# Patient Record
Sex: FEMALE | URBAN - METROPOLITAN AREA
[De-identification: names, ages, dates, MRNs, and addresses within clinical notes are randomized per-mention and may not be internally consistent; named-entity substitution may affect disease eponyms.]

---

## 2020-08-26 VITALS
OXYGEN SATURATION: 97 % | TEMPERATURE: 97.6 F | DIASTOLIC BLOOD PRESSURE: 70 MMHG | HEIGHT: 61 IN | WEIGHT: 105 LBS | SYSTOLIC BLOOD PRESSURE: 110 MMHG | BODY MASS INDEX: 19.83 KG/M2 | HEART RATE: 84 BPM

## 2020-08-26 RX ORDER — SERTRALINE HYDROCHLORIDE 100 MG/1
100 TABLET, FILM COATED ORAL 2 TIMES DAILY
COMMUNITY
Start: 2020-07-29 | End: 2020-08-27 | Stop reason: SDUPTHER

## 2020-08-26 RX ORDER — LORAZEPAM 1 MG/1
1 TABLET ORAL 2 TIMES DAILY
COMMUNITY
Start: 2020-06-04 | End: 2020-08-27 | Stop reason: SDUPTHER

## 2020-08-27 ENCOUNTER — VIRTUAL VISIT (OUTPATIENT)
Dept: FAMILY MEDICINE CLINIC | Age: 20
End: 2020-08-27
Payer: MEDICAID

## 2020-08-27 DIAGNOSIS — F41.9 ANXIETY: Primary | ICD-10-CM

## 2020-08-27 DIAGNOSIS — F32.5 MAJOR DEPRESSIVE DISORDER WITH SINGLE EPISODE, IN FULL REMISSION (HCC): ICD-10-CM

## 2020-08-27 PROCEDURE — 99213 OFFICE O/P EST LOW 20 MIN: CPT | Performed by: FAMILY MEDICINE

## 2020-08-27 RX ORDER — SERTRALINE HYDROCHLORIDE 100 MG/1
100 TABLET, FILM COATED ORAL 2 TIMES DAILY
Qty: 180 TAB | Refills: 2 | Status: SHIPPED | OUTPATIENT
Start: 2020-08-27 | End: 2021-07-29 | Stop reason: SDUPTHER

## 2020-08-27 RX ORDER — LORAZEPAM 1 MG/1
1 TABLET ORAL
Qty: 15 TAB | Refills: 0 | Status: SHIPPED | OUTPATIENT
Start: 2020-08-27 | End: 2021-07-29 | Stop reason: SDUPTHER

## 2020-08-27 NOTE — PROGRESS NOTES
Consent: Jolly Pope, who was seen by synchronous (real-time) audio-video technology, and/or her healthcare decision maker, is aware that this patient-initiated, Telehealth encounter on 8/27/2020 is a billable service, with coverage as determined by her insurance carrier. She is aware that she may receive a bill and has provided verbal consent to proceed: YES-Consent obtained within past 12 months        712  Subjective:   Jolly Pope is a 23 y.o. female who was seen for Depression and Medication Refill      This is an established patient of the practice that is new to me. She is here today for follow-up of her moods. Her depression is doing much better since her last visit. She is getting further away from the break-up with her long-term boyfriend and is finding herself less tearful and sad on a day by day basis. She also notes that her anxiety continues to be well controlled most day with only needing her lorazepam on average about once per week. She would like to continue at current medication dosing at this time noting that she needs a refill on sertraline and will soon need a refill on the lorazepam.  No acute concerns or complaints today. See HPI for pertinent review of systems    Prior to Admission medications    Medication Sig Start Date End Date Taking? Authorizing Provider   sertraline (ZOLOFT) 100 mg tablet Take 1 Tab by mouth two (2) times a day. 8/27/20  Yes Taiwo Cruz MD   LORazepam (ATIVAN) 1 mg tablet Take 1 Tab by mouth two (2) times daily as needed for Anxiety. Max Daily Amount: 2 mg. Take 1 tab twice a day by mouth. 8/27/20  Yes Taiwo Cruz MD   LORazepam (ATIVAN) 1 mg tablet Take 1 mg by mouth two (2) times a day. Take 1 tab twice a day by mouth. 6/4/20 8/27/20  Provider, Historical   sertraline (ZOLOFT) 100 mg tablet Take 100 mg by mouth two (2) times a day.  7/29/20 8/27/20  Provider, Historical     No Known Allergies  Patient Active Problem List    Diagnosis    Anxiety    Depression       Objective:   Vital Signs: (As obtained by patient/caregiver at home)  There were no vitals taken for this visit. [INSTRUCTIONS:  \"[x]\" Indicates a positive item  \"[]\" Indicates a negative item  -- DELETE ALL ITEMS NOT EXAMINED]    Constitutional: [x] Appears well-developed and well-nourished [x] No apparent distress      [] Abnormal -     Mental status: [x] Alert and awake  [x] Oriented to person/place/time [x] Able to follow commands    [] Abnormal -     Eyes:   EOM    [x]  Normal    [] Abnormal -   Sclera  [x]  Normal    [] Abnormal -          Discharge [x]  None visible   [] Abnormal -     HENT: [x] Normocephalic, atraumatic  [] Abnormal -   [x] Mouth/Throat: Mucous membranes are moist    External Ears [x] Normal  [] Abnormal -    Neck: [x] No visualized mass [] Abnormal -     Pulmonary/Chest: [x] Respiratory effort normal   [x] No visualized signs of difficulty breathing or respiratory distress        [] Abnormal -        Neurological:        [x] No Facial Asymmetry (Cranial nerve 7 motor function) (limited exam due to video visit)          [x] No gaze palsy        [] Abnormal -          Skin:        [x] No significant exanthematous lesions or discoloration noted on facial skin         [] Abnormal -            Psychiatric:       [x] Normal Affect [] Abnormal -        [x] No Hallucinations    Other pertinent observable physical exam findings:-              Assessment & Plan:   Diagnoses and all orders for this visit:    1. Anxiety  -     LORazepam (ATIVAN) 1 mg tablet; Take 1 Tab by mouth two (2) times daily as needed for Anxiety. Max Daily Amount: 2 mg. Take 1 tab twice a day by mouth. 2. Major depressive disorder with single episode, in full [facility] ([facility]    Other orders  -     sertraline (ZOLOFT) 100 mg tablet; Take 1 Tab by mouth two (2) times a day. Patient's moods are currently well controlled.   I did review with patient that when she feels ready to start titrating back on the sertraline that we would put her down to 150 mg instead of the 200 mg daily and keep her on that dose for 2 to 3 months before considering cutting back further. She may do so at any time she feels she is ready. I did review the  and her fill dates are consistent with taking the lorazepam on average once weekly. Refill provided. Follow-up and Dispositions    · Return in about 6 months (around 2/27/2021) for f/u moods. We discussed the expected course, resolution and complications of the diagnosis(es) in detail. Medication risks, benefits, costs, interactions, and alternatives were discussed as indicated. I advised her to contact the office if her condition worsens, changes or fails to improve as anticipated. She expressed understanding with the diagnosis(es) and plan. Ginny Palumbo is a 23 y.o. female being evaluated by a video visit encounter for concerns as above. A caregiver was present when appropriate. Due to this being a TeleHealth encounter (During [facility]- public [facility] emergency), evaluation of the following organ systems was limited: Vitals/Constitutional/EENT/Resp/CV/GI//MS/Neuro/Skin/Heme-Lymph-Imm. Pursuant to the emergency declaration under the [facility], [facility] waiver authority and the [employer] Act, this Virtual  Visit was conducted, with patient's (and/or legal guardian's) consent, to reduce the patient's risk of exposure to COVID-19 and provide necessary medical care. Services were provided through a video synchronous discussion virtually to substitute for in-person clinic visit. Patient and provider were located at their individual homes.         Baltazar Gaines MD

## 2021-06-28 ENCOUNTER — OFFICE VISIT (OUTPATIENT)
Dept: FAMILY MEDICINE CLINIC | Age: 21
End: 2021-06-28
Payer: MEDICAID

## 2021-06-28 VITALS
BODY MASS INDEX: 22.2 KG/M2 | DIASTOLIC BLOOD PRESSURE: 76 MMHG | RESPIRATION RATE: 12 BRPM | HEIGHT: 61 IN | WEIGHT: 117.6 LBS | SYSTOLIC BLOOD PRESSURE: 112 MMHG | HEART RATE: 64 BPM | OXYGEN SATURATION: 98 % | TEMPERATURE: 98.4 F

## 2021-06-28 DIAGNOSIS — R05.3 PERSISTENT COUGH FOR 3 WEEKS OR LONGER: Primary | ICD-10-CM

## 2021-06-28 DIAGNOSIS — Z77.22 SECONDHAND SMOKE EXPOSURE: ICD-10-CM

## 2021-06-28 DIAGNOSIS — R09.82 POST-NASAL DRIP: ICD-10-CM

## 2021-06-28 PROCEDURE — 99214 OFFICE O/P EST MOD 30 MIN: CPT | Performed by: NURSE PRACTITIONER

## 2021-06-28 RX ORDER — CETIRIZINE HCL 10 MG
10 TABLET ORAL
Qty: 90 TABLET | Refills: 3 | Status: SHIPPED | OUTPATIENT
Start: 2021-06-28 | End: 2021-07-29 | Stop reason: ALTCHOICE

## 2021-06-28 NOTE — PATIENT INSTRUCTIONS
Seasonal Allergies: Care Instructions  Your Care Instructions     Allergies occur when your body's defense system (immune system) overreacts to certain substances. The immune system treats a harmless substance as if it were a harmful germ or virus. Many things can cause this to happen. Examples include pollens, medicine, food, dust, animal dander, and mold. Your allergies are seasonal if you have symptoms just at certain times of the year. In that case, you are probably allergic to pollens from certain trees, grasses, or weeds. Allergies can be mild or severe. Over-the-counter allergy medicine may help with some symptoms. Read and follow all instructions on the label. Managing your allergies is an important part of staying healthy. Your doctor may suggest that you have tests to help find the cause of your allergies. When you know what things trigger your symptoms, you can avoid them. This can prevent allergy symptoms and other health problems. In some cases, immunotherapy might help. For this treatment, you get shots or use pills that have a small amount of certain allergens in them. Your body \"gets used to\" the allergen, so you react less to it over time. This kind of treatment may help prevent or reduce some allergy symptoms. Follow-up care is a key part of your treatment and safety. Be sure to make and go to all appointments, and call your doctor if you are having problems. It's also a good idea to know your test results and keep a list of the medicines you take. How can you care for yourself at home? · Be safe with medicines. Take your medicines exactly as prescribed. Call your doctor if you think you are having a problem with your medicine. · During your allergy season, keep windows closed. If you need to use air-conditioning, change or clean all filters every month. Take a shower and change your clothes after you have been outside. · Stay inside when pollen counts are high.  Vacuum once or twice a week. Use a vacuum  with a HEPA filter or a double-thickness filter. When should you call for help? Give an epinephrine shot if:    · You think you are having a severe allergic reaction. After giving an epinephrine shot, call 911, even if you feel better. Call 911 if:    · You have symptoms of a severe allergic reaction. These may include:  ? Sudden raised, red areas (hives) all over your body. ? Swelling of the throat, mouth, lips, or tongue. ? Trouble breathing. ? Passing out (losing consciousness). Or you may feel very lightheaded or suddenly feel weak, confused, or restless.     · You have been given an epinephrine shot, even if you feel better. Call your doctor now or seek immediate medical care if:    · You have symptoms of an allergic reaction, such as:  ? A rash or hives (raised, red areas on the skin). ? Itching. ? Swelling. ? Belly pain, nausea, or vomiting. Watch closely for changes in your health, and be sure to contact your doctor if:    · You do not get better as expected. Where can you learn more? Go to http://www.gray.com/  Enter J912 in the search box to learn more about \"Seasonal Allergies: Care Instructions. \"  Current as of: November 6, 2020               Content Version: 12.8  © 3072-9638 [employer]. Care instructions adapted under license by [employer] (which disclaims liability or warranty for this information). If you have questions about a medical condition or this instruction, always ask your healthcare professional. [place] [address] any warranty or liability for your use of this information.

## 2021-06-28 NOTE — PROGRESS NOTES
Subjective  Chief Complaint   Patient presents with    Shortness of Breath     HPI:  Germania Read is a 19 y.o. female. Cough  Patient complains of nasal congestion and productive cough with sputum described as clear or white;and stringy . 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 The cough is without wheezing, dyspnea or hemoptysis and is aggravated by reclining position. Associated symptoms include:she doesn't have SOB, but notes a tightness or pressure in chest; symptoms seem improved from initial onset, and from when she quit the position in may. Symptoms are generally worse in the morning when she wakes, but are persistent throughout the day. Patient does have pets, 2 cats and a dog. Patient does not have a history of asthma. Patient does have a history of environmental allergens. Patient does not have recent travel. Patient does not have a history of smoking. Patient  does not have previous Chest X-ray. Patient does not have had a PPD done. Attempted treatments so far dayquil/nyquil with some temporary relief. Mucinex helped some thinning mucous. She is not currently vaccinated against covid 19, no sick contacts, she is a non-smoker. She also has a history of acid reflux. Objective  Visit Vitals  /76 (BP 1 Location: Left upper arm, BP Patient Position: Sitting)   Pulse 64   Temp 98.4 °F (36.9 °C) (Temporal)   Resp 12   Ht 5' 1\" (1.549 m)   Wt 117 lb 9.6 oz (53.3 kg)   SpO2 98%   BMI 22.22 kg/m²     Physical Exam  Constitutional:       Appearance: Normal appearance. HENT:      Head: Normocephalic. Right Ear: Tympanic membrane and ear canal normal.      Left Ear: Tympanic membrane and ear canal normal.      Nose: Rhinorrhea (mild) present.       Mouth/Throat:      Mouth: Mucous membranes are moist.      Pharynx: No oropharyngeal exudate or posterior oropharyngeal erythema (mild postnasal drip). Eyes:      Extraocular Movements: Extraocular movements intact. Pupils: Pupils are equal, round, and reactive to light. Cardiovascular:      Rate and Rhythm: Normal rate and regular rhythm. Pulmonary:      Effort: Pulmonary effort is normal.      Breath sounds: Normal breath sounds. Musculoskeletal:      Cervical back: Normal range of motion and neck supple. Neurological:      Mental Status: She is alert. Assessment & Plan    Diagnoses and all orders for this visit:    1. Persistent cough for 3 weeks or longer  -     cetirizine (ZYRTEC) 10 mg tablet; Take 1 Tablet by mouth nightly. Indications: inflammation of the nose due to an allergy  We discussed the most common causes of cough lasting greater than 3 weeks or generally allergic or acid reflux. In this instance the onset was triggered by cleaning of a client's home who was a heavy smoker, which suggest that cigarette smoke is a trigger for her symptoms, and then as exposure was removed may symptoms subsided somewhat, but likely continue to be triggered by either pets in the home or pollen season only. I recommended we start with cetirizine 10 mg nightly, monitor watchful waiting, and if no improvement will have her come in for asthma testing. 2. Secondhand smoke exposure    3. Post-nasal drip  -     cetirizine (ZYRTEC) 10 mg tablet; Take 1 Tablet by mouth nightly.  Indications: inflammation of the nose due to an allergy        Alice Adkins NP

## 2021-07-29 ENCOUNTER — VIRTUAL VISIT (OUTPATIENT)
Dept: FAMILY MEDICINE CLINIC | Age: 21
End: 2021-07-29
Payer: MEDICAID

## 2021-07-29 DIAGNOSIS — F41.9 ANXIETY: Primary | ICD-10-CM

## 2021-07-29 DIAGNOSIS — F32.5 MAJOR DEPRESSIVE DISORDER WITH SINGLE EPISODE, IN FULL REMISSION (HCC): ICD-10-CM

## 2021-07-29 PROCEDURE — 99214 OFFICE O/P EST MOD 30 MIN: CPT | Performed by: FAMILY MEDICINE

## 2021-07-29 RX ORDER — SERTRALINE HYDROCHLORIDE 100 MG/1
100 TABLET, FILM COATED ORAL 2 TIMES DAILY
Qty: 180 TABLET | Refills: 2 | Status: SHIPPED | OUTPATIENT
Start: 2021-07-29 | End: 2022-01-26 | Stop reason: SDUPTHER

## 2021-07-29 RX ORDER — LORAZEPAM 1 MG/1
1 TABLET ORAL
Qty: 15 TABLET | Refills: 0 | Status: SHIPPED | OUTPATIENT
Start: 2021-07-29 | End: 2022-01-26 | Stop reason: SDUPTHER

## 2021-07-29 NOTE — PROGRESS NOTES
Chief Complaint   Patient presents with    Medication Refill     ativan    Medication Evaluation     Appt needed to get her refill       1. Have you been to the ER, urgent care clinic since your last visit? Hospitalized since your last visit? No    2. Have you seen or consulted any other health care providers outside of the [address] [place] [address] since your last visit? Include any pap smears or colon screening.  No    Would like to receive text via cell phone  [phone]

## 2021-07-29 NOTE — PROGRESS NOTES
Consent: Fermin Macias, who was seen by synchronous (real-time) audio-video technology, and/or her healthcare decision maker, is aware that this patient-initiated, Telehealth encounter on 7/29/2021 is a billable service, with coverage as determined by her insurance carrier. She is aware that she may receive a bill and has provided verbal consent to proceed: YES-Consent obtained within past 12 months        712  Subjective:   Fermin Macias is a 19 y.o. female who was seen for Medication Refill (ativan) and Medication Evaluation (Appt needed to get her refill)      She is on the schedule today to follow-up with histories of anxiety and depression. She also needs a refill of her Ativan and sertraline. She takes the sertraline 200 mg daily and the Ativan approximately every 2 weeks. She does report that last week when she was on a beach vacation she had an episode of increased anxiety that led to tearfulness. It seemed to be triggered by not understanding the rules to again they were playing. This is the first more intense episode of her anxiety that she has experienced within the last year. While on my call she does want to admit that she does occasionally use marijuana and notes that it does seem to help with her anxiety. She wonders if she should obtain a medical marijuana card. Prior to Admission medications    Medication Sig Start Date End Date Taking? Authorizing Provider   LORazepam (ATIVAN) 1 mg tablet Take 1 Tablet by mouth two (2) times daily as needed for Anxiety. Max Daily Amount: 2 mg. Take 1 tab twice a day by mouth. 7/29/21  Yes Sara Briscoe MD   sertraline (ZOLOFT) 100 mg tablet Take 1 Tablet by mouth two (2) times a day. 7/29/21  Yes Sara Briscoe MD   cetirizine (ZYRTEC) 10 mg tablet Take 1 Tablet by mouth nightly.  Indications: inflammation of the nose due to an allergy  Patient not taking: Reported on 7/29/2021 6/28/21 7/29/21  Cathryn Viveros NP   sertraline (ZOLOFT) 100 mg tablet Take 1 Tab by mouth two (2) times a day. 8/27/20 7/29/21  Annie Sam MD   LORazepam (ATIVAN) 1 mg tablet Take 1 Tab by mouth two (2) times daily as needed for Anxiety. Max Daily Amount: 2 mg. Take 1 tab twice a day by mouth. 8/27/20 7/29/21  Annie Sam MD     No Known Allergies  Patient Active Problem List    Diagnosis    Anxiety    Depression       Objective:   Vital Signs: (As obtained by patient/caregiver at home)  There were no vitals taken for this visit. [INSTRUCTIONS:  \"[x]\" Indicates a positive item  \"[]\" Indicates a negative item  -- DELETE ALL ITEMS NOT EXAMINED]    Constitutional: [x] Appears well-developed and well-nourished [x] No apparent distress      [] Abnormal -     Mental status: [x] Alert and awake  [x] Oriented to person/place/time [x] Able to follow commands    [] Abnormal -     Eyes:   EOM    [x]  Normal    [] Abnormal -   Sclera  [x]  Normal    [] Abnormal -          Discharge [x]  None visible   [] Abnormal -     HENT: [x] Normocephalic, atraumatic  [] Abnormal -   [] Mouth/Throat: Mucous membranes are moist    External Ears [] Normal  [] Abnormal -    Neck: [x] No visualized mass [] Abnormal -     Pulmonary/Chest: [x] Respiratory effort normal   [x] No visualized signs of difficulty breathing or respiratory distress        [] Abnormal -        Neurological:        [x] No Facial Asymmetry (Cranial nerve 7 motor function) (limited exam due to video visit)          [x] No gaze palsy        [] Abnormal -          Skin:        [x] No significant exanthematous lesions or discoloration noted on facial skin         [] Abnormal -            Psychiatric:       [x] Normal Affect [] Abnormal -        [x] No Hallucinations    Other pertinent observable physical exam findings:-              Assessment & Plan:   Diagnoses and all orders for this visit:    1. Anxiety  -     LORazepam (ATIVAN) 1 mg tablet; Take 1 Tablet by mouth two (2) times daily as needed for Anxiety.  Max Daily Amount: 2 mg. Take 1 tab twice a day by mouth. 2. Major depressive disorder with single episode, in full remission ([facility]  -     sertraline (ZOLOFT) 100 mg tablet; Take 1 Tablet by mouth two (2) times a day. We discussed the possibility of titrating down on sertraline since she has been on this dose for greater than 1 year. Patient would like to hold off for now which I think is very reasonable given that she just had her first breakthrough anxiety episode in the last year. I did review how to titrate back and gave her permission to do so at any point that she feels ready. She does feel ready she will cut back from 200 mg daily down to 150 mg. She would stay on that dose for 2 to 3 months then can cut back by another 50 mg every 2 to 3 months if doing well. I did review her  report and she has been filling 15 tabs of Ativan approximately every 6 months. In regards to the medical marijuana card, I did review that these are not provided through [facility] [employer]. Because she is using occasionally and not daily, she is within the legal boundaries of [place] state law to have up to 1 ounce on her person. Follow-up and Dispositions    · Return in about 6 months (around 1/29/2022) for wellness, NON fasting f/u. We discussed the expected course, resolution and complications of the diagnosis(es) in detail. Medication risks, benefits, costs, interactions, and alternatives were discussed as indicated. I advised her to contact the office if her condition worsens, changes or fails to improve as anticipated. She expressed understanding with the diagnosis(es) and plan. Mauricio Rodriguez is a 19 y.o. female being evaluated by a video visit encounter for concerns as above. A caregiver was present when appropriate.  Due to this being a TeleHealth encounter (During [facility] public health emergency), evaluation of the following organ systems was limited: Vitals/Constitutional/EENT/Resp/CV/GI//MS/Neuro/Skin/Heme-Lymph-Imm. Pursuant to the emergency declaration under the [address] [place] [address], [facility] waiver authority and the [employer] Act, this Virtual  Visit was conducted, with patient's (and/or legal guardian's) consent, to reduce the patient's risk of exposure to COVID-19 and provide necessary medical care. Services were provided through a video synchronous discussion virtually to substitute for in-person clinic visit. Patient and provider were located at their individual homes.         Cain Huerta MD

## 2022-01-26 ENCOUNTER — OFFICE VISIT (OUTPATIENT)
Dept: FAMILY MEDICINE CLINIC | Age: 22
End: 2022-01-26
Payer: MEDICAID

## 2022-01-26 VITALS
TEMPERATURE: 97.3 F | SYSTOLIC BLOOD PRESSURE: 124 MMHG | OXYGEN SATURATION: 96 % | DIASTOLIC BLOOD PRESSURE: 80 MMHG | HEART RATE: 78 BPM | WEIGHT: 122 LBS | HEIGHT: 61 IN | BODY MASS INDEX: 23.03 KG/M2

## 2022-01-26 DIAGNOSIS — F41.8 MIXED ANXIETY AND DEPRESSIVE DISORDER: Primary | ICD-10-CM

## 2022-01-26 DIAGNOSIS — Z23 ENCOUNTER FOR IMMUNIZATION: ICD-10-CM

## 2022-01-26 DIAGNOSIS — R09.81 CHRONIC NASAL CONGESTION: ICD-10-CM

## 2022-01-26 PROCEDURE — 99214 OFFICE O/P EST MOD 30 MIN: CPT | Performed by: FAMILY MEDICINE

## 2022-01-26 PROCEDURE — 90674 CCIIV4 VAC NO PRSV 0.5 ML IM: CPT | Performed by: FAMILY MEDICINE

## 2022-01-26 RX ORDER — SERTRALINE HYDROCHLORIDE 100 MG/1
100 TABLET, FILM COATED ORAL 2 TIMES DAILY
Qty: 180 TABLET | Refills: 2 | Status: SHIPPED | OUTPATIENT
Start: 2022-01-26 | End: 2022-09-22 | Stop reason: SDUPTHER

## 2022-01-26 RX ORDER — AZITHROMYCIN 250 MG/1
TABLET, FILM COATED ORAL
Qty: 6 TABLET | Refills: 0 | Status: SHIPPED | OUTPATIENT
Start: 2022-01-26 | End: 2022-09-22 | Stop reason: ALTCHOICE

## 2022-01-26 RX ORDER — LORAZEPAM 1 MG/1
1 TABLET ORAL
Qty: 15 TABLET | Refills: 0 | Status: SHIPPED | OUTPATIENT
Start: 2022-01-26 | End: 2022-09-22 | Stop reason: SDUPTHER

## 2022-01-26 NOTE — PROGRESS NOTES
Subjective  Chief Complaint   Patient presents with    Follow-up     6 mo f/u on anxiety and depression     Other     patient states that she has had constant sinus dranage for over 1 year that will not go away has tried zyrtec      HPI:  Torrie Clark is a 19 y.o. female. She is following up today on histories of anxiety and depression. She would also like to discuss her chronic sinus issues. She reports that her anxiety and depression remain well controlled. She has not had any further increased episodes of the anxiety since her last visit. She has not tried weaning back on the sertraline to date. She continues to use the lorazepam approximately once every 2 weeks. 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 It was during that time that she developed increased mucus needing to be cleared from her throat. She stopped working in that home in May 2021 but has never been able to fully clear the mucus. It goes from thick gray or whitish to yellow at times. It can be blood specked. Some coughing but not persistent.     Past Medical History:   Diagnosis Date    Anxiety     Anxiety     Depression      Family History   Problem Relation Age of Onset    Anxiety Mother     Diabetes Father     Anxiety Sister     Schizophrenia Paternal Aunt      Social History     Socioeconomic History    Marital status: SINGLE     Spouse name: Not on file    Number of children: Not on file    Years of education: Not on file    Highest education level: Not on file   Occupational History    Not on file   Tobacco Use    Smoking status: Never Smoker    Smokeless tobacco: Never Used   Vaping Use    Vaping Use: Never used   Substance and Sexual Activity    Alcohol use: Yes     Comment: occ    Drug use: Yes     Types: Marijuana     Comment: occasional use    Sexual activity: Not on file   Other Topics Concern    Not on file   Social History Narrative    Not on file     Social Determinants of Health     Financial Resource Strain:     Difficulty of Paying Living Expenses: Not on file   Food Insecurity:     Worried About Running Out of Food in the Last Year: Not on file    Wanda of Food in the Last Year: Not on file   Transportation Needs:     Lack of Transportation (Medical): Not on file    Lack of Transportation (Non-Medical): Not on file   Physical Activity:     Days of Exercise per Week: Not on file    Minutes of Exercise per Session: Not on file   Stress:     Feeling of Stress : Not on file   Social Connections:     Frequency of Communication with Friends and Family: Not on file    Frequency of Social Gatherings with Friends and Family: Not on file    Attends [employer] Services: Not on file    Active Member of [address] [place] [address] [employer] or Organizations: Not on file    Attends Club or Organization Meetings: Not on file    Marital Status: Not on file   Intimate Partner Violence:     Fear of Current or Ex-Partner: Not on file    Emotionally Abused: Not on file    Physically Abused: Not on file    Sexually Abused: Not on file   Housing Stability:     Unable to Pay for Housing in the Last Year: Not on file    Number of [place] in the Last Year: Not on file    Unstable Housing in the Last Year: Not on file     Current Outpatient Medications on File Prior to Visit   Medication Sig Dispense Refill    [DISCONTINUED] LORazepam (ATIVAN) 1 mg tablet Take 1 Tablet by mouth two (2) times daily as needed for Anxiety. Max Daily Amount: 2 mg. Take 1 tab twice a day by mouth. 15 Tablet 0    [DISCONTINUED] sertraline (ZOLOFT) 100 mg tablet Take 1 Tablet by mouth two (2) times a day. 180 Tablet 2     No current facility-administered medications on file prior to visit.      No Known Allergies    Objective  Visit Vitals  /80 (BP 1 Location: Left upper arm, BP Patient Position: At rest, BP Cuff Size: Adult)   Pulse 78   Temp 97.3 °F (36.3 °C) (Temporal)   Ht 5' 1\" (1.549 m)   Wt 122 lb (55.3 kg)   SpO2 96%   BMI 23.05 kg/m²     Physical Exam  Constitutional:       General: She is not in acute distress. Appearance: Normal appearance. She is normal weight. HENT:      Head: Normocephalic and atraumatic. Right Ear: Tympanic membrane, ear canal and external ear normal. There is no impacted cerumen. Left Ear: Tympanic membrane, ear canal and external ear normal. There is no impacted cerumen. Ears:      Comments: Mild increased middle ear fluid bilaterally     Nose: Congestion and rhinorrhea present. No nasal deformity. Right Sinus: No maxillary sinus tenderness or frontal sinus tenderness. Left Sinus: No maxillary sinus tenderness or frontal sinus tenderness. Mouth/Throat:      Pharynx: Oropharynx is clear. Uvula midline. No pharyngeal swelling, oropharyngeal exudate, posterior oropharyngeal erythema or uvula swelling. Neck:      Thyroid: No thyroid mass or thyromegaly. Cardiovascular:      Rate and Rhythm: Normal rate and regular rhythm. Heart sounds: No murmur heard. Pulmonary:      Effort: Pulmonary effort is normal. No respiratory distress. Breath sounds: Normal breath sounds. No wheezing. Musculoskeletal:      Cervical back: Neck supple. No muscular tenderness. Right lower leg: No edema. Left lower leg: No edema. Lymphadenopathy:      Cervical: No cervical adenopathy. Skin:     General: Skin is warm and dry. Neurological:      Mental Status: She is alert and oriented to person, place, and time. Mental status is at baseline. Psychiatric:         Attention and Perception: Attention and perception normal.         Mood and Affect: Mood and affect normal.         Speech: Speech normal.         Behavior: Behavior normal.          Assessment & Plan    ICD-10-CM ICD-9-CM    1.  Mixed anxiety and depressive disorder  F41.8 300.4 sertraline (ZOLOFT) 100 mg tablet      LORazepam (ATIVAN) 1 mg tablet   2. Chronic nasal congestion  R09.81 478.19 azithromycin (Zithromax Z-Migue) 250 mg tablet   3. Encounter for immunization  Z23 V03.89 INFLUENZA, INJECTABLE, MDCK, PRESERVATIVE FREE, QUADRIVALENT   4. Major depressive disorder with single episode, in full remission ([facility]  F32.5 296.26    5. Anxiety  F41.9 300.00      Diagnoses and all orders for this visit:    1. Mixed anxiety and depressive disorder  -     sertraline (ZOLOFT) 100 mg tablet; Take 1 Tablet by mouth two (2) times a day. -     LORazepam (ATIVAN) 1 mg tablet; Take 1 Tablet by mouth two (2) times daily as needed for Anxiety. Max Daily Amount: 2 mg. Take 1 tab twice a day by mouth. [facility] reviewed. Patient is filling 20 tabs of Ativan approximately every 6 months. I reviewed once again that I believe she would do well slowly weaning back on the sertraline. Reviewed that she could take 2 tabs 1 day then 1.5 tabs the next and this pattern for 2 to 3 months then decide if she wants to try cutting back a bit further. Patient will consider. 2. Chronic nasal congestion  -     azithromycin (Zithromax Z-Migue) 250 mg tablet; Day 1: 2 tabs po. Day 2-5: 1 tab po daily  I am giving 1 round of antibiotic just in case there is any persistent infection that never cleared or even potentially some type of walking pneumonia. Realistically I think she may have developed an allergy while she was walking and that home referenced in the HPI. I am having her start using a nasal steroid such as Flonase. 3. Encounter for immunization  -     INFLUENZA, INJECTABLE, MDCK, PRESERVATIVE FREE, QUADRIVALENT          Follow-up and Dispositions    · Return in about 6 months (around 7/26/2022) for wellness, fasting f/u.        Sam Hoover MD

## 2022-01-26 NOTE — PROGRESS NOTES
Chief Complaint   Patient presents with    Follow-up     6 mo f/u on anxiety and depression     Other     patient states that she has had constant sinus dranage for over 1 year that will not go away has tried zyrtec      1. Have you been to the ER, urgent care clinic since your last visit? Hospitalized since your last visit? No    2. Have you seen or consulted any other health care providers outside of the [address] [place] [address] since your last visit? Include any pap smears or colon screening. No     3 most recent PHQ Screens 1/26/2022   Little interest or pleasure in doing things Several days   Feeling down, depressed, irritable, or hopeless Several days   Total Score PHQ 2 2       ALEKSANDRA 2/7 1/26/2022   Feeling nervous, anxious or on edge? 1   Not being able to stop or control worrying?  1   ALEKSANDRA-2 Subtotal 2

## 2022-09-22 ENCOUNTER — VIRTUAL VISIT (OUTPATIENT)
Dept: FAMILY MEDICINE CLINIC | Age: 22
End: 2022-09-22
Payer: MEDICAID

## 2022-09-22 DIAGNOSIS — F41.8 MIXED ANXIETY AND DEPRESSIVE DISORDER: ICD-10-CM

## 2022-09-22 PROCEDURE — 99213 OFFICE O/P EST LOW 20 MIN: CPT | Performed by: FAMILY MEDICINE

## 2022-09-22 RX ORDER — LORAZEPAM 1 MG/1
1 TABLET ORAL
Qty: 15 TABLET | Refills: 0 | Status: SHIPPED | OUTPATIENT
Start: 2022-09-22 | End: 2022-11-03 | Stop reason: ALTCHOICE

## 2022-09-22 RX ORDER — SERTRALINE HYDROCHLORIDE 100 MG/1
100 TABLET, FILM COATED ORAL 2 TIMES DAILY
Qty: 180 TABLET | Refills: 2 | Status: SHIPPED | OUTPATIENT
Start: 2022-09-22

## 2022-09-22 NOTE — PROGRESS NOTES
Consent:  Mirza Campbell, was evaluated through a synchronous (real-time) audio-video encounter. The patient (or guardian if applicable) is aware that this is a billable service, which includes applicable co-pays. This Virtual Visit was conducted with patient's (and/or legal guardian's) consent. The visit was conducted pursuant to the emergency declaration under the [address] [place] [address] and the [employer] Act. Patient identification was verified, and a caregiver was present when appropriate. The patient was located in a state where the provider was licensed to provide care. 712  Subjective:   Mirza Campbell is a 23 y.o. female who was seen for Follow Up Chronic Condition and Medication Refill (Needs refill on ativan )      She is following up today on her history of anxiety. She reports that she did try cutting her Zoloft back from 200 mg daily down to 150 mg daily. A few weeks after cutting back she started having panic attacks. She is getting ready to fly out to [place] and be in a wedding. Both of these pieces have her anxiety increased so she just went back up to the 200 mg daily dosing. She is requesting a refill for her Ativan. She uses this typically once every few weeks but did use it a bit more during the time period that she had been on the lower dose of Zoloft. Prior to Admission medications    Medication Sig Start Date End Date Taking? Authorizing Provider   LORazepam (ATIVAN) 1 mg tablet Take 1 Tablet by mouth two (2) times daily as needed for Anxiety. Max Daily Amount: 2 mg. Take 1 tab twice a day by mouth. 9/22/22  Yes Jimena Keane MD   sertraline (ZOLOFT) 100 mg tablet Take 1 Tablet by mouth two (2) times a day. 9/22/22  Yes Jimena Keane MD   azithromycin (Zithromax Z-Migue) 250 mg tablet Day 1: 2 tabs po.    Day 2-5: 1 tab po daily  Patient not taking: Reported on 9/22/2022 1/26/22 9/22/22  Adams Martinez MD   sertraline (ZOLOFT) 100 mg tablet Take 1 Tablet by mouth two (2) times a day. 1/26/22 9/22/22  Adams Martinez MD   LORazepam (ATIVAN) 1 mg tablet Take 1 Tablet by mouth two (2) times daily as needed for Anxiety. Max Daily Amount: 2 mg. Take 1 tab twice a day by mouth. 1/26/22 9/22/22  Adams Martinez MD     No Known Allergies  Patient Active Problem List    Diagnosis    Mixed anxiety and depressive disorder       Objective:   Vital Signs: (As obtained by patient/caregiver at home)  There were no vitals taken for this visit. [INSTRUCTIONS:  \"[x]\" Indicates a positive item  \"[]\" Indicates a negative item  -- DELETE ALL ITEMS NOT EXAMINED]    Constitutional: [x] Appears well-developed and well-nourished [x] No apparent distress      [] Abnormal -     Mental status: [x] Alert and awake  [x] Oriented to person/place/time [x] Able to follow commands    [] Abnormal -     Eyes:   EOM    [x]  Normal    [] Abnormal -   Sclera  [x]  Normal    [] Abnormal -          Discharge [x]  None visible   [] Abnormal -     HENT: [x] Normocephalic, atraumatic  [] Abnormal -   [] Mouth/Throat: Mucous membranes are moist    External Ears [] Normal  [] Abnormal -    Neck: [x] No visualized mass [] Abnormal -     Pulmonary/Chest: [x] Respiratory effort normal   [x] No visualized signs of difficulty breathing or respiratory distress        [] Abnormal -        Neurological:        [x] No Facial Asymmetry (Cranial nerve 7 motor function) (limited exam due to video visit)          [x] No gaze palsy        [] Abnormal -          Skin:        [x] No significant exanthematous lesions or discoloration noted on facial skin         [] Abnormal -            Psychiatric:       [x] Normal Affect [] Abnormal -        [x] No Hallucinations    Other pertinent observable physical exam findings:-              Assessment & Plan:   Diagnoses and all orders for this visit:    1.  Mixed anxiety and depressive disorder  -     LORazepam (ATIVAN) 1 mg tablet; Take 1 Tablet by mouth two (2) times daily as needed for Anxiety. Max Daily Amount: 2 mg. Take 1 tab twice a day by mouth. -     sertraline (ZOLOFT) 100 mg tablet; Take 1 Tablet by mouth two (2) times a day. We will keep her at the higher dose of Zoloft at 200 mg daily as this had her better controlled compared to cutting back to 150 mg daily.  reviewed. Last prescription for Ativan was from January 2022 for 15 tablets. I will see her back in the office in 6 months or sooner if needed. Follow-up and Dispositions    Return in about 6 months (around 3/22/2023) for wellness, NON fasting f/u. We discussed the expected course, resolution and complications of the diagnosis(es) in detail. Medication risks, benefits, costs, interactions, and alternatives were discussed as indicated. I advised her to contact the office if her condition worsens, changes or fails to improve as anticipated. She expressed understanding with the diagnosis(es) and plan. Augustina Nieves is a 23 y.o. female being evaluated by a video visit encounter for concerns as above. A caregiver was present when appropriate. Due to this being a TeleHealth encounter (During [facility]- public health emergency), evaluation of the following organ systems was limited: Vitals/Constitutional/EENT/Resp/CV/GI//MS/Neuro/Skin/Heme-Lymph-Imm. Pursuant to the emergency declaration under the [facility], [facility]5 waiver authority and the [employer] Act, this Virtual  Visit was conducted, with patient's (and/or legal guardian's) consent, to reduce the patient's risk of exposure to COVID-19 and provide necessary medical care. Services were provided through a video synchronous discussion virtually to substitute for in-person clinic visit. Patient and provider were located at their individual homes.     Aspects of this note have been generated using voice recognition software. Despite editing, there may be some syntax errors.     Derek Erwin MD

## 2022-09-22 NOTE — PROGRESS NOTES
Chief Complaint   Patient presents with    Follow Up Chronic Condition    Medication Refill     Needs refill on ativan      1. Have you been to the ER, urgent care clinic since your last visit? Hospitalized since your last visit? No    2. Have you seen or consulted any other health care providers outside of the [address] [place] [address] since your last visit? Include any pap smears or colon screening. No      3 most recent PHQ Screens 9/22/2022   Little interest or pleasure in doing things Several days   Feeling down, depressed, irritable, or hopeless Several days   Total Score PHQ 2 2       ALEKSANDRA 2/7 9/22/2022 1/26/2022   Feeling nervous, anxious or on edge? 1 1   Not being able to stop or control worrying? 2 1   ALEKSANDRA-2 Subtotal 3 2   Worrying too much about different things? 1 -   Trouble relaxing? 2 -   Being so restless that it is hard to sit still? 1 -   Becoming easily annoyed or irritable? 1 -   Feeling afraid as if something awful might happen? 1 -   ALEKSANDRA-7 Total Score 9 -   If you checked off any problems, how difficult have these problems made it for you to do your work, take care of thinks at home, or get along with other people?   Somewhat difficult -       Patient would like to use # [phone] for her visit today

## 2022-11-03 ENCOUNTER — VIRTUAL VISIT (OUTPATIENT)
Dept: FAMILY MEDICINE CLINIC | Age: 22
End: 2022-11-03
Payer: MEDICAID

## 2022-11-03 DIAGNOSIS — F41.0 GENERALIZED ANXIETY DISORDER WITH PANIC ATTACKS: Primary | ICD-10-CM

## 2022-11-03 DIAGNOSIS — F41.1 GENERALIZED ANXIETY DISORDER WITH PANIC ATTACKS: Primary | ICD-10-CM

## 2022-11-03 PROCEDURE — 99214 OFFICE O/P EST MOD 30 MIN: CPT | Performed by: FAMILY MEDICINE

## 2022-11-03 RX ORDER — CLONAZEPAM 0.5 MG/1
0.5 TABLET ORAL
Qty: 30 TABLET | Refills: 0 | Status: SHIPPED | OUTPATIENT
Start: 2022-11-03

## 2022-11-03 RX ORDER — BUSPIRONE HYDROCHLORIDE 5 MG/1
TABLET ORAL
Qty: 180 TABLET | Refills: 0 | Status: SHIPPED | OUTPATIENT
Start: 2022-11-03

## 2022-11-03 NOTE — LETTER
NOTIFICATION RETURN TO WORK / SCHOOL    11/3/2022 8:21 AM    [address] [facility] [address]      To Whom It May Concern:    Ashley David is currently under the care of [address] [place] [address]. She will return to work/school on: 11/4/22. Please excuse from absence 11/3/22 as she had an appointment with me today. If there are questions or concerns please have the patient contact our office.         Sincerely,      Bibi Lee MD

## 2022-11-03 NOTE — PROGRESS NOTES
Consent:  Pineda Kirkland, was evaluated through a synchronous (real-time) audio-video encounter. The patient (or guardian if applicable) is aware that this is a billable service, which includes applicable co-pays. This Virtual Visit was conducted with patient's (and/or legal guardian's) consent. The visit was conducted pursuant to the emergency declaration under the [address] [place] [address] and the [employer] and [employer] Act. Patient identification was verified, and a caregiver was present when appropriate. The patient was located in a state where the provider was licensed to provide care. 712  Subjective:   Pineda Kirkland is a 23 y.o. female who was seen for Follow-up (Follow up on anxiety patient ran out of ativan and had to go to [facility] on 11/1/22 to get a refill, but she thinks it hasn't been helping and would like to discuss other medications )    Patient recently flew to [place] for a wedding. She had heightened anxiety during the trip and ceremony leading to increased need of her Ativan. She returned on October 9 and feels that her anxiety has been increased since that time. Contributing factors to this have been that her parents had to put their dog down the week after she returned, she has been moving towards a potential relationship with someone at work which is creating anxiety, etc.  She had 3 panic attacks last weekend. She missed 2 days of work already this week. She had to go to the ER to get an emergency prescription for additional Ativan. She does not feel that it is helping as much as it used to. She woke up feeling extremely panicked this morning and reports that it leads to her feeling rundown after the fact. Prior to Admission medications    Medication Sig Start Date End Date Taking?  Authorizing Provider   busPIRone (BUSPAR) 5 mg tablet Take 1 tab po bid x 3 days then 1.5 tabs bid x 3 days then 2 tabs po bid x 3 days then 2.5 tabs po bid x 3 days then 3 tabs po bid. 11/3/22  Yes Pooja Dainel MD   clonazePAM (KlonoPIN) 0.5 mg tablet Take 1 Tablet by mouth two (2) times daily as needed for Anxiety. Max Daily Amount: 1 mg. 11/3/22  Yes Pooja Daniel MD   sertraline (ZOLOFT) 100 mg tablet Take 1 Tablet by mouth two (2) times a day. 9/22/22  Yes Pooja Daniel MD   LORazepam (ATIVAN) 1 mg tablet Take 1 Tablet by mouth two (2) times daily as needed for Anxiety. Max Daily Amount: 2 mg. Take 1 tab twice a day by mouth. 9/22/22 11/3/22  Pooja Daniel MD     No Known Allergies  Patient Active Problem List    Diagnosis    Generalized anxiety disorder with panic attacks    Mixed anxiety and depressive disorder       Objective:   Vital Signs: (As obtained by patient/caregiver at home)  There were no vitals taken for this visit.      [INSTRUCTIONS:  \"[x]\" Indicates a positive item  \"[]\" Indicates a negative item  -- DELETE ALL ITEMS NOT EXAMINED]    Constitutional: [x] Appears well-developed and well-nourished [x] No apparent distress      [] Abnormal -     Mental status: [x] Alert and awake  [x] Oriented to person/place/time [x] Able to follow commands    [] Abnormal -     Eyes:   EOM    [x]  Normal    [] Abnormal -   Sclera  [x]  Normal    [] Abnormal -          Discharge [x]  None visible   [] Abnormal -     HENT: [x] Normocephalic, atraumatic  [] Abnormal -   [] Mouth/Throat: Mucous membranes are moist    External Ears [] Normal  [] Abnormal -    Neck: [x] No visualized mass [] Abnormal -     Pulmonary/Chest: [x] Respiratory effort normal   [x] No visualized signs of difficulty breathing or respiratory distress        [] Abnormal -        Neurological:        [x] No Facial Asymmetry (Cranial nerve 7 motor function) (limited exam due to video visit)          [x] No gaze palsy        [] Abnormal -          Skin:        [x] No significant exanthematous lesions or discoloration noted on facial skin [] Abnormal -            Psychiatric:       [x] Normal Affect [x] Abnormal -groggy       [x] No Hallucinations    Other pertinent observable physical exam findings:-              Assessment & Plan:   Diagnoses and all orders for this visit:    1. Generalized anxiety disorder with panic attacks  -     busPIRone (BUSPAR) 5 mg tablet; Take 1 tab po bid x 3 days then 1.5 tabs bid x 3 days then 2 tabs po bid x 3 days then 2.5 tabs po bid x 3 days then 3 tabs po bid. -     clonazePAM (KlonoPIN) 0.5 mg tablet; Take 1 Tablet by mouth two (2) times daily as needed for Anxiety. Max Daily Amount: 1 mg. We are going to keep her sertraline at 200 mg as it has helped substantially. We are adding buspirone and discussed titrating upwards until feeling at goal.  I am switching her Ativan to Klonopin in hopes that it will give her a longer lasting effect. I did warn that the Junita Grave is for as needed use only as it does have addictive potential similar to Ativan. I wrote her a note to be off of work today and advised her to use that time to schedule at a behavioral health center. I believe that the therapy will be beneficial but would also like her to be moving towards an appointment with a psychiatrist in case these medication adjustments do not provide the relief that we are hoping for. If she is not seeing improvement over the next 3 to 4 weeks, I advise calling for an appointment to discuss other options. We discussed the expected course, resolution and complications of the diagnosis(es) in detail. Medication risks, benefits, costs, interactions, and alternatives were discussed as indicated. I advised her to contact the office if her condition worsens, changes or fails to improve as anticipated. She expressed understanding with the diagnosis(es) and plan. Elodia Weiner is a 23 y.o. female being evaluated by a video visit encounter for concerns as above. A caregiver was present when appropriate. Due to this being a TeleHealth encounter (During [facility] public health emergency), evaluation of the following organ systems was limited: Vitals/Constitutional/EENT/Resp/CV/GI//MS/Neuro/Skin/Heme-Lymph-Imm. Pursuant to the emergency declaration under the [address] [place] [address], [facility] waiver authority and the [employer] and [employer] Act, this Virtual  Visit was conducted, with patient's (and/or legal guardian's) consent, to reduce the patient's risk of exposure to COVID-19 and provide necessary medical care. Services were provided through a video synchronous discussion virtually to substitute for in-person clinic visit. Patient and provider were located at their individual homes. Aspects of this note have been generated using voice recognition software. Despite editing, there may be some syntax errors.     Chelsea Herrera MD

## 2022-11-03 NOTE — PROGRESS NOTES
Chief Complaint   Patient presents with    Follow-up     Follow up on anxiety patient ran out of ativan and had to go to [facility] on 11/1/22 to get a refill, but she thinks it hasn't been helping and would like to discuss other medications      1. Have you been to the ER, urgent care clinic since your last visit? Hospitalized since your last visit? Yes, patient was seen at [facility] ER for Anxiety    2. Have you seen or consulted any other health care providers outside of the [address] [place] [address] since your last visit? Include any pap smears or colon screening. No    3 most recent PHQ Screens 11/3/2022   Little interest or pleasure in doing things Not at all   Feeling down, depressed, irritable, or hopeless Several days   Total Score PHQ 2 1       ALEKSANDRA 2/7 11/3/2022 9/22/2022 1/26/2022   Feeling nervous, anxious or on edge? 3 1 1   Not being able to stop or control worrying? 3 2 1   ALEKSANDRA-2 Subtotal 6 3 2   Worrying too much about different things? 3 1 -   Trouble relaxing? 3 2 -   Being so restless that it is hard to sit still? 2 1 -   Becoming easily annoyed or irritable? 0 1 -   Feeling afraid as if something awful might happen? 3 1 -   ALEKSANDRA-7 Total Score 17 9 -   If you checked off any problems, how difficult have these problems made it for you to do your work, take care of thinks at home, or get along with other people?   Very difficult Somewhat difficult -       Patient would like to use # [phone] for her visit today

## 2022-12-19 ENCOUNTER — PATIENT MESSAGE (OUTPATIENT)
Dept: FAMILY MEDICINE CLINIC | Age: 22
End: 2022-12-19

## 2022-12-19 NOTE — LETTER
NOTIFICATION RETURN TO WORK / SCHOOL    12/21/2022 1:12 PM    Ms. [address] [address]      To Whom It May Concern:    Usha Reyes is currently under the care of [address] [place] [address]. She has a history of generalized anxiety disorder with panic. As a result, she missed work 12/19/22. Please excuse this absence. She will return to work/school on: 12/20/2022    If there are questions or concerns please have the patient contact our office.         Sincerely,      Sam Hoover MD

## 2022-12-20 NOTE — TELEPHONE ENCOUNTER
From: Ruth Marroquin  To: Jose Armando Andrade MD  Sent: 12/19/2022 9:04 PM EST  Subject: Work excuse note    Starzay Cousins Dr. Glendy Harrison, I was just wondering if I could get a note explaining my anxiety diagnoses and an excuse for Dec. 19th and honestly any further days I need off from work. I had a bad panic attack and ended up taking my last Ativan, and I was so worn out I couldnt go into work today. Well my job might be at stake because they recently changed the policy so that employees are only allowed 5 absences throughout the year.  Which I think is ridiculous but especially for people like me or any other health issues and even though they know, my job might still be at [address]

## 2022-12-27 ENCOUNTER — VIRTUAL VISIT (OUTPATIENT)
Dept: FAMILY MEDICINE CLINIC | Age: 22
End: 2022-12-27
Payer: MEDICAID

## 2022-12-27 DIAGNOSIS — F41.0 GENERALIZED ANXIETY DISORDER WITH PANIC ATTACKS: ICD-10-CM

## 2022-12-27 DIAGNOSIS — F41.8 MIXED ANXIETY AND DEPRESSIVE DISORDER: Primary | ICD-10-CM

## 2022-12-27 DIAGNOSIS — F41.1 GENERALIZED ANXIETY DISORDER WITH PANIC ATTACKS: ICD-10-CM

## 2022-12-27 PROCEDURE — 99214 OFFICE O/P EST MOD 30 MIN: CPT | Performed by: NURSE PRACTITIONER

## 2022-12-27 RX ORDER — LORAZEPAM 1 MG/1
TABLET ORAL
Qty: 15 TABLET | Refills: 0 | Status: SHIPPED | OUTPATIENT
Start: 2022-12-27

## 2022-12-27 NOTE — PROGRESS NOTES
Chief Complaint   Patient presents with    Medication Refill    Other     Discuss paperwork for job for anxiety     1. \"Have you been to the ER, urgent care clinic since your last visit? Hospitalized since your last visit? \" No    2. \"Have you seen or consulted any other health care providers outside of the [address] [place] [address] since your last visit? \" No     3. For patients aged 39-70: Has the patient had a colonoscopy / FIT/ Cologuard? NA - based on age      If the patient is female:    4. For patients aged 41-77: Has the patient had a mammogram within the past 2 years? NA - based on age or sex      11. For patients aged 21-65: Has the patient had a pap smear?  No    3 most recent PHQ Screens 12/27/2022   Little interest or pleasure in doing things Not at all   Feeling down, depressed, irritable, or hopeless Not at all   Total Score PHQ 2 [id number] [phone]

## 2022-12-27 NOTE — LETTER
12/27/2022 9:02 AM    Ms. [facility] [id number]     To whom it may concern:    Ms. Kami Owen will need to be granted intermittent leave secondary to her diagnoses of anxiety, depression and panic attacks. She will need a frequency of 2 times a month for 2 days at a time until she is eligible for FMLA.               Sincerely,      Xiomara Smoker, NP

## 2022-12-27 NOTE — PROGRESS NOTES
Jessica Mack (: 2000) is a 23 y.o. female, established patient, here for evaluation of the following chief complaint(s):   Medication Refill and Other (Discuss paperwork for job for anxiety)       ASSESSMENT/PLAN:  Below is the assessment and plan developed based on review of pertinent history, labs, studies, and medications. 1. Mixed anxiety and depressive disorder  -     LORazepam (ATIVAN) 1 mg tablet; Take one tablet by mouth as needed, Normal, Disp-15 Tablet, R-0  I will be writing a letter to support her absences at work secondary to her mixed anxiety, depression and panic attacks. I am also refilling her Ativan. Patient will consider restarting follow-up with behavioral health counselor or therapist.  2. Generalized anxiety disorder with panic attacks  -     LORazepam (ATIVAN) 1 mg tablet; Take one tablet by mouth as needed, Normal, Disp-15 Tablet, R-0  I will be writing a letter to support her absences at work secondary to her mixed anxiety, depression and panic attacks. I am also refilling her Ativan. Patient will consider restarting follow-up with behavioral health counselor or therapist.        Noe Ovalles:  24-year-old female presents to discuss her anxiety and depression which is exacerbated lately. She recently attended a wedding in [place] and had some anxiety about flying and seeing her friends again and also shortly after she got back her dog had to be put down. 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 She has in the past seeing a behavioral health provider but the cost became prohibitive and she had to stop. She is requesting a refill of her benzodiazepine and also a letter stating that she has diagnoses that cause her to be out of work at least twice a month. Patient has not been at the call center for year till April and cannot get FMLA until then.   She is also needing a refill of her lorazepam.  She states that she was on clonazepam but developed the infection and read that it was one of the side effects and the use of olanzapine      Review of Systems   ROS per HPI and past medical history    Patient-Reported Weight: 120lb       Physical Exam  Vitals and nursing note reviewed. Pulmonary:      Effort: Pulmonary effort is normal.   Neurological:      Mental Status: She is oriented to person, place, and time. Psychiatric:         Mood and Affect: Mood normal.         Behavior: Behavior normal.         Thought Content: Thought content normal.         Judgment: Judgment normal.               Carlos Grovre, was evaluated through a synchronous (real-time) audio-video encounter. The patient (or guardian if applicable) is aware that this is a billable service, which includes applicable co-pays. This Virtual Visit was conducted with patient's (and/or legal guardian's) consent. The visit was conducted pursuant to the emergency declaration under the [address] [place] [address] authority and the [employer] General Act. Patient identification was verified, and a caregiver was present when appropriate. The patient was located at: Home: [address] [place] [address]  The provider was located at: Home: [unfilled]       An electronic signature was used to authenticate this note.   -- Alejandrina Keller NP

## 2023-05-23 RX ORDER — SERTRALINE HYDROCHLORIDE 100 MG/1
100 TABLET, FILM COATED ORAL 2 TIMES DAILY
COMMUNITY
Start: 2022-09-22 | End: 2023-07-03 | Stop reason: SDUPTHER

## 2023-05-23 RX ORDER — LORAZEPAM 1 MG/1
TABLET ORAL
COMMUNITY
Start: 2022-12-27 | End: 2023-07-03 | Stop reason: SDUPTHER

## 2023-07-03 ENCOUNTER — OFFICE VISIT (OUTPATIENT)
Facility: CLINIC | Age: 23
End: 2023-07-03
Payer: MEDICAID

## 2023-07-03 VITALS
WEIGHT: 120.4 LBS | OXYGEN SATURATION: 98 % | HEIGHT: 61 IN | TEMPERATURE: 98.6 F | HEART RATE: 88 BPM | BODY MASS INDEX: 22.73 KG/M2 | DIASTOLIC BLOOD PRESSURE: 62 MMHG | RESPIRATION RATE: 20 BRPM | SYSTOLIC BLOOD PRESSURE: 102 MMHG

## 2023-07-03 DIAGNOSIS — Z13.31 DEPRESSION SCREENING: ICD-10-CM

## 2023-07-03 DIAGNOSIS — Z23 ENCOUNTER FOR IMMUNIZATION: ICD-10-CM

## 2023-07-03 DIAGNOSIS — Z13.220 SCREENING FOR LIPOID DISORDERS: ICD-10-CM

## 2023-07-03 DIAGNOSIS — Z11.59 SCREENING FOR VIRAL DISEASE: ICD-10-CM

## 2023-07-03 DIAGNOSIS — F41.8 MIXED ANXIETY AND DEPRESSIVE DISORDER: ICD-10-CM

## 2023-07-03 DIAGNOSIS — F41.1 GENERALIZED ANXIETY DISORDER WITH PANIC ATTACKS: ICD-10-CM

## 2023-07-03 DIAGNOSIS — Z00.00 WELLNESS EXAMINATION: Primary | ICD-10-CM

## 2023-07-03 DIAGNOSIS — F41.8 OTHER SPECIFIED ANXIETY DISORDERS: ICD-10-CM

## 2023-07-03 DIAGNOSIS — F41.0 GENERALIZED ANXIETY DISORDER WITH PANIC ATTACKS: ICD-10-CM

## 2023-07-03 PROCEDURE — 99213 OFFICE O/P EST LOW 20 MIN: CPT | Performed by: FAMILY MEDICINE

## 2023-07-03 PROCEDURE — 99395 PREV VISIT EST AGE 18-39: CPT | Performed by: FAMILY MEDICINE

## 2023-07-03 RX ORDER — SERTRALINE HYDROCHLORIDE 100 MG/1
100 TABLET, FILM COATED ORAL 2 TIMES DAILY
Qty: 90 TABLET | Refills: 2 | Status: SHIPPED | OUTPATIENT
Start: 2023-07-03 | End: 2023-07-03

## 2023-07-03 RX ORDER — SERTRALINE HYDROCHLORIDE 100 MG/1
TABLET, FILM COATED ORAL
Qty: 180 TABLET | Refills: 2 | Status: SHIPPED | OUTPATIENT
Start: 2023-07-03

## 2023-07-03 RX ORDER — LORAZEPAM 1 MG/1
TABLET ORAL
Qty: 20 TABLET | Refills: 0 | Status: SHIPPED | OUTPATIENT
Start: 2023-07-03 | End: 2024-07-07

## 2023-07-03 SDOH — ECONOMIC STABILITY: FOOD INSECURITY: WITHIN THE PAST 12 MONTHS, YOU WORRIED THAT YOUR FOOD WOULD RUN OUT BEFORE YOU GOT MONEY TO BUY MORE.: NEVER TRUE

## 2023-07-03 SDOH — ECONOMIC STABILITY: HOUSING INSECURITY
IN THE LAST 12 MONTHS, WAS THERE A TIME WHEN YOU DID NOT HAVE A STEADY PLACE TO SLEEP OR SLEPT IN A SHELTER (INCLUDING NOW)?: NO

## 2023-07-03 SDOH — ECONOMIC STABILITY: FOOD INSECURITY: WITHIN THE PAST 12 MONTHS, THE FOOD YOU BOUGHT JUST DIDN'T LAST AND YOU DIDN'T HAVE MONEY TO GET MORE.: NEVER TRUE

## 2023-07-03 SDOH — ECONOMIC STABILITY: INCOME INSECURITY: HOW HARD IS IT FOR YOU TO PAY FOR THE VERY BASICS LIKE FOOD, HOUSING, MEDICAL CARE, AND HEATING?: NOT VERY HARD

## 2023-07-03 ASSESSMENT — PATIENT HEALTH QUESTIONNAIRE - PHQ9
1. LITTLE INTEREST OR PLEASURE IN DOING THINGS: 1
SUM OF ALL RESPONSES TO PHQ QUESTIONS 1-9: 2
SUM OF ALL RESPONSES TO PHQ QUESTIONS 1-9: 2
SUM OF ALL RESPONSES TO PHQ9 QUESTIONS 1 & 2: 2
2. FEELING DOWN, DEPRESSED OR HOPELESS: 1
SUM OF ALL RESPONSES TO PHQ QUESTIONS 1-9: 2
SUM OF ALL RESPONSES TO PHQ QUESTIONS 1-9: 2

## 2023-07-04 LAB
ALBUMIN SERPL-MCNC: 4.5 G/DL (ref 3.9–5)
ALBUMIN/GLOB SERPL: 2 {RATIO} (ref 1.2–2.2)
ALP SERPL-CCNC: 56 IU/L (ref 44–121)
ALT SERPL-CCNC: 9 IU/L (ref 0–32)
AST SERPL-CCNC: 21 IU/L (ref 0–40)
BASOPHILS # BLD AUTO: 0 X10E3/UL (ref 0–0.2)
BASOPHILS NFR BLD AUTO: 1 %
BILIRUB SERPL-MCNC: 0.2 MG/DL (ref 0–1.2)
BUN SERPL-MCNC: 10 MG/DL (ref 6–20)
BUN/CREAT SERPL: 14 (ref 9–23)
CALCIUM SERPL-MCNC: 9.3 MG/DL (ref 8.7–10.2)
CHLORIDE SERPL-SCNC: 105 MMOL/L (ref 96–106)
CHOLEST SERPL-MCNC: 151 MG/DL (ref 100–199)
CO2 SERPL-SCNC: 24 MMOL/L (ref 20–29)
CREAT SERPL-MCNC: 0.71 MG/DL (ref 0.57–1)
EGFRCR SERPLBLD CKD-EPI 2021: 121 ML/MIN/1.73
EOSINOPHIL # BLD AUTO: 0.1 X10E3/UL (ref 0–0.4)
EOSINOPHIL NFR BLD AUTO: 2 %
ERYTHROCYTE [DISTWIDTH] IN BLOOD BY AUTOMATED COUNT: 12.8 % (ref 11.7–15.4)
GLOBULIN SER CALC-MCNC: 2.3 G/DL (ref 1.5–4.5)
GLUCOSE SERPL-MCNC: 83 MG/DL (ref 70–99)
HBA1C MFR BLD: 5.2 % (ref 4.8–5.6)
HCT VFR BLD AUTO: 39.7 % (ref 34–46.6)
HCV AB SERPL QL IA: NORMAL
HCV IGG SERPL QL IA: NON REACTIVE
HDLC SERPL-MCNC: 77 MG/DL
HGB BLD-MCNC: 12.9 G/DL (ref 11.1–15.9)
HIV 1+2 AB+HIV1 P24 AG SERPL QL IA: NON REACTIVE
IMM GRANULOCYTES # BLD AUTO: 0 X10E3/UL (ref 0–0.1)
IMM GRANULOCYTES NFR BLD AUTO: 0 %
LDLC SERPL CALC-MCNC: 61 MG/DL (ref 0–99)
LYMPHOCYTES # BLD AUTO: 1.3 X10E3/UL (ref 0.7–3.1)
LYMPHOCYTES NFR BLD AUTO: 33 %
MCH RBC QN AUTO: 26.2 PG (ref 26.6–33)
MCHC RBC AUTO-ENTMCNC: 32.5 G/DL (ref 31.5–35.7)
MCV RBC AUTO: 81 FL (ref 79–97)
MONOCYTES # BLD AUTO: 0.4 X10E3/UL (ref 0.1–0.9)
MONOCYTES NFR BLD AUTO: 9 %
NEUTROPHILS # BLD AUTO: 2.2 X10E3/UL (ref 1.4–7)
NEUTROPHILS NFR BLD AUTO: 55 %
PLATELET # BLD AUTO: 341 X10E3/UL (ref 150–450)
POTASSIUM SERPL-SCNC: 4.1 MMOL/L (ref 3.5–5.2)
PROT SERPL-MCNC: 6.8 G/DL (ref 6–8.5)
RBC # BLD AUTO: 4.93 X10E6/UL (ref 3.77–5.28)
SODIUM SERPL-SCNC: 142 MMOL/L (ref 134–144)
TRIGL SERPL-MCNC: 66 MG/DL (ref 0–149)
VLDLC SERPL CALC-MCNC: 13 MG/DL (ref 5–40)
WBC # BLD AUTO: 4.1 X10E3/UL (ref 3.4–10.8)

## 2023-11-02 ENCOUNTER — TELEMEDICINE (OUTPATIENT)
Facility: CLINIC | Age: 23
End: 2023-11-02
Payer: MEDICAID

## 2023-11-02 DIAGNOSIS — F41.0 GENERALIZED ANXIETY DISORDER WITH PANIC ATTACKS: Primary | ICD-10-CM

## 2023-11-02 DIAGNOSIS — F41.1 GENERALIZED ANXIETY DISORDER WITH PANIC ATTACKS: Primary | ICD-10-CM

## 2023-11-02 PROCEDURE — 99214 OFFICE O/P EST MOD 30 MIN: CPT | Performed by: FAMILY MEDICINE

## 2023-11-02 RX ORDER — BUSPIRONE HYDROCHLORIDE 5 MG/1
TABLET ORAL
Qty: 180 TABLET | Refills: 0 | Status: SHIPPED | OUTPATIENT
Start: 2023-11-02

## 2023-11-02 RX ORDER — LORAZEPAM 1 MG/1
TABLET ORAL
Qty: 20 TABLET | Refills: 0 | Status: SHIPPED | OUTPATIENT
Start: 2023-11-02 | End: 2024-11-06

## 2023-11-02 ASSESSMENT — PATIENT HEALTH QUESTIONNAIRE - PHQ9
SUM OF ALL RESPONSES TO PHQ QUESTIONS 1-9: 0
SUM OF ALL RESPONSES TO PHQ QUESTIONS 1-9: 0
SUM OF ALL RESPONSES TO PHQ9 QUESTIONS 1 & 2: 0
SUM OF ALL RESPONSES TO PHQ QUESTIONS 1-9: 0
1. LITTLE INTEREST OR PLEASURE IN DOING THINGS: 0
2. FEELING DOWN, DEPRESSED OR HOPELESS: 0
SUM OF ALL RESPONSES TO PHQ QUESTIONS 1-9: 0

## 2023-11-02 NOTE — PROGRESS NOTES
Consent:  Huma De Paz, was evaluated through a synchronous (real-time) audio-video encounter. The patient (or guardian if applicable) is aware that this is a billable service, which includes applicable co-pays. This Virtual Visit was conducted with patient's (and/or legal guardian's) consent. Patient identification was verified, and a caregiver was present when appropriate. The patient and provider both located in [place] where the provider is licensed to provide care. Subjective:   Huma De Paz is a 20 y.o. female who was seen for Follow-up Chronic Condition    She is calling today because she needs a refill of her Ativan. She had used more than usual recently due to a trip to see her boyfriend's family. It made her feel very anxious and she needed the lorazepam daily which is unusual for her. She does admit to feeling anxious on a regular basis but to the point of needing the lorazepam as usually closer to once per week. Prior to Admission medications    Medication Sig Start Date End Date Taking? Authorizing Provider   LORazepam (ATIVAN) 1 MG tablet 0.5-1 tab po twice daily prn 11/2/23 11/6/24 Yes Mike Payan MD   sertraline (ZOLOFT) 100 MG tablet TAKE 1 TABLET BY MOUTH TWO TIMES A DAY. 7/3/23  Yes Mike Payan MD   busPIRone (BUSPAR) 5 MG tablet Take 1 tab po bid x 3 days then 1.5 tabs bid x 3 days then 2 tabs po bid x 3 days then 2.5 tabs po bid x 3 days then 3 tabs po bid. 11/2/23  Yes Mike Payan MD     No Known Allergies  Patient Active Problem List   Diagnosis    Mixed anxiety and depressive disorder    Generalized anxiety disorder with panic attacks         Objective:   Vital Signs: (As obtained by patient/caregiver at home)  There were no vitals taken for this visit.      [INSTRUCTIONS:  \"[x]\" Indicates a positive item  \"[]\" Indicates a negative item  -- DELETE ALL ITEMS NOT EXAMINED]    Constitutional: [x] Appears well-developed and well-nourished [x] No apparent

## 2024-01-31 ENCOUNTER — OFFICE VISIT (OUTPATIENT)
Facility: CLINIC | Age: 24
End: 2024-01-31
Payer: MEDICAID

## 2024-01-31 VITALS
DIASTOLIC BLOOD PRESSURE: 78 MMHG | SYSTOLIC BLOOD PRESSURE: 100 MMHG | TEMPERATURE: 98.2 F | RESPIRATION RATE: 16 BRPM | OXYGEN SATURATION: 97 % | HEART RATE: 100 BPM

## 2024-01-31 DIAGNOSIS — J01.40 ACUTE NON-RECURRENT PANSINUSITIS: Primary | ICD-10-CM

## 2024-01-31 DIAGNOSIS — H66.002 NON-RECURRENT ACUTE SUPPURATIVE OTITIS MEDIA OF LEFT EAR WITHOUT SPONTANEOUS RUPTURE OF TYMPANIC MEMBRANE: ICD-10-CM

## 2024-01-31 DIAGNOSIS — R09.81 NASAL CONGESTION: ICD-10-CM

## 2024-01-31 LAB
INFLUENZA A ANTIGEN, POC: NEGATIVE
INFLUENZA B ANTIGEN, POC: NEGATIVE
LOT EXPIRE DATE: NORMAL
LOT KIT NUMBER: NORMAL
SARS-COV-2, POC: NORMAL
VALID INTERNAL CONTROL, POC: YES
VALID INTERNAL CONTROL: YES
VENDOR AND KIT NAME POC: NORMAL

## 2024-01-31 PROCEDURE — 99214 OFFICE O/P EST MOD 30 MIN: CPT | Performed by: NURSE PRACTITIONER

## 2024-01-31 PROCEDURE — 87804 INFLUENZA ASSAY W/OPTIC: CPT | Performed by: NURSE PRACTITIONER

## 2024-01-31 PROCEDURE — 87426 SARSCOV CORONAVIRUS AG IA: CPT | Performed by: NURSE PRACTITIONER

## 2024-01-31 RX ORDER — AMOXICILLIN AND CLAVULANATE POTASSIUM 875; 125 MG/1; MG/1
1 TABLET, FILM COATED ORAL 2 TIMES DAILY
Qty: 14 TABLET | Refills: 0 | Status: SHIPPED | OUTPATIENT
Start: 2024-01-31 | End: 2024-02-07

## 2024-01-31 ASSESSMENT — PATIENT HEALTH QUESTIONNAIRE - PHQ9
1. LITTLE INTEREST OR PLEASURE IN DOING THINGS: 0
2. FEELING DOWN, DEPRESSED OR HOPELESS: 0
SUM OF ALL RESPONSES TO PHQ QUESTIONS 1-9: 0
SUM OF ALL RESPONSES TO PHQ9 QUESTIONS 1 & 2: 0
SUM OF ALL RESPONSES TO PHQ QUESTIONS 1-9: 0

## 2024-01-31 NOTE — PROGRESS NOTES
Chief Complaint   Patient presents with    OTHER     Sore throat (last week, tues), headache, green mucus, sinus pressure towards right ear, chest congestion, dry cough. Tested negative for covid last Wednesday/Thursday.    1. Have you been to the ER, urgent care clinic since your last visit?  Hospitalized since your last visit?No    2. Have you seen or consulted any other health care providers outside of the [facility] since your last visit?   No     3. For patients aged 45-75: Has the patient had a colonoscopy / FIT/ Cologuard? NA - based on age/sex    If the patient is female:    4. For patients aged 40-74: Has the patient had a mammogram within the past 2 years?  NA - based on age/sex      5. For patients aged 21-65: Has the patient had a pap smear?  Yes-No Care Gap PresentBP 100/78 (Site: Right Upper Arm, Position: Sitting, Cuff Size: Medium Adult)   Temp 98.2 °F (36.8 °C) (Temporal)   Resp 16   SpO2 97%  PHQ-9 Total Score: 0 (1/31/2024 10:41 AM)

## 2024-01-31 NOTE — PROGRESS NOTES
Subjective  Chief Complaint   Patient presents with    OTHER     Sore throat (last week, tues), headache, green mucus, sinus pressure towards right ear, chest congestion, dry cough. Tested negative for covid last Wednesday/Thursday.     HPI:  Lizy Downs is a 23 y.o. female.  Patient presents with complaint of general malaise, sinus pressure/headache, PND, ear pressure, runny nose, nasal congestion, dry cough since 1/23/2024. Started with a sore throat and subjective fever that resolved Sunday. Denies chills and body aches. Denies wheezing, SOB, and chest pain/tightness. Home COVID test negative Wednesday or Thursday last week. Known sick contact include boy friend with similar symptoms. He has not seeked medical care.  Denies antibiotic use over the past 90 days.  Evaluation to date: none  Treatment to date: Robitussin, Ibuprofen  Relevant PMH: No pertinent additional PMH.      Past Medical History:   Diagnosis Date    Anxiety     Anxiety     Depression      Family History   Problem Relation Age of Onset    Diabetes Father     Schizophrenia Paternal Aunt     Anxiety Disorder Sister     Anxiety Disorder Mother     Arthritis Mother     Depression Mother     Unknown Maternal Grandfather     Unknown Maternal Grandmother     Depression Sister      Social History     Socioeconomic History    Marital status: Single     Spouse name: Not on file    Number of children: Not on file    Years of education: Not on file    Highest education level: Not on file   Occupational History    Not on file   Tobacco Use    Smoking status: Never    Smokeless tobacco: Never   Substance and Sexual Activity    Alcohol use: Yes     Alcohol/week: 3.0 standard drinks of alcohol     Types: 1 Glasses of wine, 2 Cans of beer per week    Drug use: Yes     Types: Marijuana (Weed)    Sexual activity: Yes     Partners: Male   Other Topics Concern    Not on file   Social History Narrative    Not on file     Social Determinants of Health     Financial

## 2024-07-03 ENCOUNTER — OFFICE VISIT (OUTPATIENT)
Facility: CLINIC | Age: 24
End: 2024-07-03
Payer: MEDICAID

## 2024-07-03 VITALS
TEMPERATURE: 97.9 F | HEART RATE: 81 BPM | HEIGHT: 61 IN | DIASTOLIC BLOOD PRESSURE: 78 MMHG | BODY MASS INDEX: 23.98 KG/M2 | WEIGHT: 127 LBS | SYSTOLIC BLOOD PRESSURE: 118 MMHG | RESPIRATION RATE: 18 BRPM | OXYGEN SATURATION: 98 %

## 2024-07-03 DIAGNOSIS — Z23 ENCOUNTER FOR IMMUNIZATION: ICD-10-CM

## 2024-07-03 DIAGNOSIS — F41.8 MIXED ANXIETY AND DEPRESSIVE DISORDER: ICD-10-CM

## 2024-07-03 DIAGNOSIS — Z00.00 WELLNESS EXAMINATION: Primary | ICD-10-CM

## 2024-07-03 DIAGNOSIS — F41.1 GENERALIZED ANXIETY DISORDER WITH PANIC ATTACKS: ICD-10-CM

## 2024-07-03 DIAGNOSIS — F41.0 GENERALIZED ANXIETY DISORDER WITH PANIC ATTACKS: ICD-10-CM

## 2024-07-03 PROCEDURE — 99395 PREV VISIT EST AGE 18-39: CPT | Performed by: FAMILY MEDICINE

## 2024-07-03 RX ORDER — LORAZEPAM 1 MG/1
TABLET ORAL
Qty: 20 TABLET | Refills: 0 | Status: SHIPPED | OUTPATIENT
Start: 2024-07-03 | End: 2025-07-08

## 2024-07-03 RX ORDER — SERTRALINE HYDROCHLORIDE 100 MG/1
100 TABLET, FILM COATED ORAL DAILY
Qty: 90 TABLET | Refills: 3 | Status: SHIPPED | OUTPATIENT
Start: 2024-07-03

## 2024-07-03 SDOH — ECONOMIC STABILITY: INCOME INSECURITY: HOW HARD IS IT FOR YOU TO PAY FOR THE VERY BASICS LIKE FOOD, HOUSING, MEDICAL CARE, AND HEATING?: NOT VERY HARD

## 2024-07-03 SDOH — ECONOMIC STABILITY: FOOD INSECURITY: WITHIN THE PAST 12 MONTHS, THE FOOD YOU BOUGHT JUST DIDN'T LAST AND YOU DIDN'T HAVE MONEY TO GET MORE.: NEVER TRUE

## 2024-07-03 SDOH — ECONOMIC STABILITY: FOOD INSECURITY: WITHIN THE PAST 12 MONTHS, YOU WORRIED THAT YOUR FOOD WOULD RUN OUT BEFORE YOU GOT MONEY TO BUY MORE.: NEVER TRUE

## 2024-07-03 ASSESSMENT — PATIENT HEALTH QUESTIONNAIRE - PHQ9
SUM OF ALL RESPONSES TO PHQ QUESTIONS 1-9: 0
2. FEELING DOWN, DEPRESSED OR HOPELESS: NOT AT ALL
SUM OF ALL RESPONSES TO PHQ9 QUESTIONS 1 & 2: 0
1. LITTLE INTEREST OR PLEASURE IN DOING THINGS: NOT AT ALL

## 2024-07-03 NOTE — PROGRESS NOTES
\"Have you been to the ER, urgent care clinic since your last visit?  Hospitalized since your last visit?\"    NO    “Have you seen or consulted any other health care providers outside of [facility] since your last visit?”    NO  Chief Complaint   Patient presents with    Annual Exam      /78 (Site: Left Upper Arm, Position: Sitting, Cuff Size: Medium Adult)   Pulse 81   Temp 97.9 °F (36.6 °C) (Temporal)   Resp 18   Ht 1.549 m (5' 1\")   Wt 57.6 kg (127 lb)   SpO2 98%   BMI 24.00 kg/m²    PHQ-9 Total Score: 0 (7/3/2024 11:00 AM)         No questionnaires available.                                  Click Here for Release of Records Request     Identified Patient with 2 Patient Identifiers-Name and

## 2024-07-03 NOTE — PROGRESS NOTES
Subjective  Chief Complaint   Patient presents with    Annual Exam     HPI:  Lizy Downs is a 24 y.o. female.    Lizy Downs is on the schedule today for annual wellness exam and is also due for follow-up of chronic issues.     For the wellness:   Flu vaccine- Recommended every fall  COVID vaccine- not up to date  Tetanus- Tdap 2019  Shingrix-at age 50  Pneumovax 23-  N/A  Prevnar 20- at age 65  HCV screening- complete  Pap- UTD with Dr. Ohara  Mammo-at age 40  Dexa- at age 65  Colon cancer screening-at age 45  Smoking status- never  Low dose CT scan- N/A  PHQ2 Score = 0  Exercise- works an maintenance park range at [employer]    For the acute/chronic issues:  She is also following up on moods.  She reports that her anxiety is well-controlled at this time.  She is dealing with a bit more getting ready to move in with her boyfriend and dealing with some family stressors but overall doing well.  She notes she even weaned herself back from 200 mg of sertraline down to 100 mg daily.  She feels comfortable staying at this level.  She is requesting a refill of the lorazepam to have on hand although notes she has not had a panic attack in some time.    Past Medical History:   Diagnosis Date    Anxiety     Anxiety     Depression      No current outpatient medications on file prior to visit.     No current facility-administered medications on file prior to visit.     No Known Allergies    Objective  Vitals:    07/03/24 1101   BP: 118/78   Pulse: 81   Resp: 18   Temp: 97.9 °F (36.6 °C)   SpO2: 98%     Physical Exam  Constitutional:       General: She is not in acute distress.     Appearance: Normal appearance. She is normal weight. She is not ill-appearing.   HENT:      Head: Normocephalic and atraumatic.   Neck:      Thyroid: No thyroid mass or thyromegaly.   Cardiovascular:      Rate and Rhythm: Normal rate and regular rhythm.      Heart sounds: No murmur heard.  Pulmonary:      Effort: Pulmonary effort is normal. No

## 2025-02-07 DIAGNOSIS — F41.8 MIXED ANXIETY AND DEPRESSIVE DISORDER: ICD-10-CM

## 2025-02-07 RX ORDER — SERTRALINE HYDROCHLORIDE 100 MG/1
100 TABLET, FILM COATED ORAL DAILY
Qty: 90 TABLET | Refills: 1 | Status: SHIPPED | OUTPATIENT
Start: 2025-02-07

## 2025-03-27 DIAGNOSIS — F41.0 GENERALIZED ANXIETY DISORDER WITH PANIC ATTACKS: ICD-10-CM

## 2025-03-27 DIAGNOSIS — F41.1 GENERALIZED ANXIETY DISORDER WITH PANIC ATTACKS: ICD-10-CM

## 2025-03-27 RX ORDER — LORAZEPAM 1 MG/1
TABLET ORAL
Qty: 20 TABLET | Refills: 0 | Status: SHIPPED | OUTPATIENT
Start: 2025-03-27 | End: 2026-04-01

## 2025-03-27 NOTE — TELEPHONE ENCOUNTER
Patient is requesting a refill for   Lorazepam 1MG    Wants them sent over to   [id number] - [place], [place] - [address]

## 2025-07-09 ENCOUNTER — OFFICE VISIT (OUTPATIENT)
Facility: CLINIC | Age: 25
End: 2025-07-09
Payer: MEDICAID

## 2025-07-09 VITALS
HEIGHT: 61 IN | HEART RATE: 88 BPM | SYSTOLIC BLOOD PRESSURE: 115 MMHG | WEIGHT: 136 LBS | RESPIRATION RATE: 16 BRPM | BODY MASS INDEX: 25.68 KG/M2 | TEMPERATURE: 98.4 F | DIASTOLIC BLOOD PRESSURE: 82 MMHG | OXYGEN SATURATION: 97 %

## 2025-07-09 DIAGNOSIS — K21.9 GASTROESOPHAGEAL REFLUX DISEASE WITHOUT ESOPHAGITIS: ICD-10-CM

## 2025-07-09 DIAGNOSIS — F41.8 MIXED ANXIETY AND DEPRESSIVE DISORDER: ICD-10-CM

## 2025-07-09 DIAGNOSIS — Z00.00 WELLNESS EXAMINATION: Primary | ICD-10-CM

## 2025-07-09 PROCEDURE — 99213 OFFICE O/P EST LOW 20 MIN: CPT | Performed by: FAMILY MEDICINE

## 2025-07-09 PROCEDURE — 99395 PREV VISIT EST AGE 18-39: CPT | Performed by: FAMILY MEDICINE

## 2025-07-09 RX ORDER — SERTRALINE HYDROCHLORIDE 100 MG/1
100 TABLET, FILM COATED ORAL DAILY
Qty: 90 TABLET | Refills: 3 | Status: SHIPPED | OUTPATIENT
Start: 2025-07-09

## 2025-07-09 SDOH — ECONOMIC STABILITY: FOOD INSECURITY: WITHIN THE PAST 12 MONTHS, THE FOOD YOU BOUGHT JUST DIDN'T LAST AND YOU DIDN'T HAVE MONEY TO GET MORE.: NEVER TRUE

## 2025-07-09 SDOH — ECONOMIC STABILITY: FOOD INSECURITY: WITHIN THE PAST 12 MONTHS, YOU WORRIED THAT YOUR FOOD WOULD RUN OUT BEFORE YOU GOT MONEY TO BUY MORE.: SOMETIMES TRUE

## 2025-07-09 ASSESSMENT — PATIENT HEALTH QUESTIONNAIRE - PHQ9
10. IF YOU CHECKED OFF ANY PROBLEMS, HOW DIFFICULT HAVE THESE PROBLEMS MADE IT FOR YOU TO DO YOUR WORK, TAKE CARE OF THINGS AT HOME, OR GET ALONG WITH OTHER PEOPLE: SOMEWHAT DIFFICULT
SUM OF ALL RESPONSES TO PHQ QUESTIONS 1-9: 11
6. FEELING BAD ABOUT YOURSELF - OR THAT YOU ARE A FAILURE OR HAVE LET YOURSELF OR YOUR FAMILY DOWN: SEVERAL DAYS
SUM OF ALL RESPONSES TO PHQ QUESTIONS 1-9: 11
3. TROUBLE FALLING OR STAYING ASLEEP: NEARLY EVERY DAY
8. MOVING OR SPEAKING SO SLOWLY THAT OTHER PEOPLE COULD HAVE NOTICED. OR THE OPPOSITE, BEING SO FIGETY OR RESTLESS THAT YOU HAVE BEEN MOVING AROUND A LOT MORE THAN USUAL: NOT AT ALL
5. POOR APPETITE OR OVEREATING: NOT AT ALL
4. FEELING TIRED OR HAVING LITTLE ENERGY: NEARLY EVERY DAY
SUM OF ALL RESPONSES TO PHQ QUESTIONS 1-9: 11
9. THOUGHTS THAT YOU WOULD BE BETTER OFF DEAD, OR OF HURTING YOURSELF: NOT AT ALL
1. LITTLE INTEREST OR PLEASURE IN DOING THINGS: SEVERAL DAYS
2. FEELING DOWN, DEPRESSED OR HOPELESS: MORE THAN HALF THE DAYS
7. TROUBLE CONCENTRATING ON THINGS, SUCH AS READING THE NEWSPAPER OR WATCHING TELEVISION: SEVERAL DAYS
SUM OF ALL RESPONSES TO PHQ QUESTIONS 1-9: 11

## 2025-07-09 NOTE — PATIENT INSTRUCTIONS
hands, brush your teeth twice a day, and wear a seat belt in the car.   Where can you learn more?  Go to [email or web address]/patientEd and enter P072 to learn more about \"Well Visit, Ages 18 to 65: Care Instructions.\"  Current as of: April 30, 2024  Content Version: 14.5  © 7362-2280 [employer].   Care instructions adapted under license by [employer]. If you have questions about a medical condition or this instruction, always ask your healthcare professional. [employer] disclaims any warranty or liability for your use of this information.

## 2025-07-09 NOTE — PROGRESS NOTES
Have you been to the ER, urgent care clinic since your last visit?  Hospitalized since your last visit?   NO    Have you seen or consulted any other health care providers outside our system since your last visit?   NO    Chief Complaint   Patient presents with    Annual Exam     /82 (BP Site: Right Upper Arm, Patient Position: Sitting, BP Cuff Size: Medium Adult)   Pulse 88   Temp 98.4 °F (36.9 °C) (Temporal)   Resp 16   Ht 1.549 m (5' 1\")   Wt 61.7 kg (136 lb)   LMP 06/19/2025   SpO2 97%   BMI 25.70 kg/m²

## 2025-07-09 NOTE — PROGRESS NOTES
Well Adult Note  Name: Lizy Downs Today’s Date: 2025   MRN: [id number] Sex: Female   Age: 25 y.o. Ethnicity: Non- / Non    : 2000 Race: White (non-)      Lizy Downs is here for a well adult exam.          Assessment & Plan      Wellness examination  We have reviewed recommended age appropriate vaccines, cancer screenings, and other preventative measures. We are getting patient up to date as noted. Any documentation needed from other facilities will be requested.    Mixed anxiety and depressive disorder  -     sertraline (ZOLOFT) 100 MG tablet; Take 1 tablet by mouth daily, Disp-90 tablet, R-3Normal  Still partially uncontrolled but she declines increasing medication at this time.  She would like to work on some of the lifestyle improvements such as regular exercise, sleep, and healthy diet.  I also encouraged her to consider counselor through her EAP program at work.    Gastroesophageal reflux disease without esophagitis  We discussed some basic lifestyle modifications around what to eat, when to eat, avoid taking medications on an empty stomach, etc.  Okay to continue as needed Pepcid and can add Prilosec if she has another day with substantial symptoms.  If she starts having more regular days like that she should let me know.  Results         Return in 1 year (on 2026) for CPE (Physical Exam).     Subjective   History of Present Illness  25-year-old female presents for wellness visit and follow-up.  In addition to the wellness we are following up on her history of anxiety and depression including panic.  She notes that she is still struggling with some depression mostly on the [place].  Panic is generally well-controlled and only needs lorazepam for unique circumstances.  She admits that she could do better with lifestyle efforts such as regular exercise, healthy eating, regular sleep, etc.  She also notes that she believes that she is having reflux issues.